# Patient Record
Sex: FEMALE | Race: WHITE | Employment: FULL TIME | URBAN - METROPOLITAN AREA
[De-identification: names, ages, dates, MRNs, and addresses within clinical notes are randomized per-mention and may not be internally consistent; named-entity substitution may affect disease eponyms.]

---

## 2019-07-20 ENCOUNTER — APPOINTMENT (OUTPATIENT)
Dept: GENERAL RADIOLOGY | Age: 39
End: 2019-07-20
Payer: MEDICAID

## 2019-07-20 ENCOUNTER — HOSPITAL ENCOUNTER (EMERGENCY)
Age: 39
Discharge: HOME | End: 2019-07-20
Attending: EMERGENCY MEDICINE
Payer: MEDICAID

## 2019-07-20 VITALS
TEMPERATURE: 98.4 F | WEIGHT: 153.22 LBS | SYSTOLIC BLOOD PRESSURE: 132 MMHG | BODY MASS INDEX: 28.93 KG/M2 | HEART RATE: 99 BPM | HEIGHT: 61 IN | OXYGEN SATURATION: 98 % | RESPIRATION RATE: 20 BRPM | DIASTOLIC BLOOD PRESSURE: 99 MMHG

## 2019-07-20 DIAGNOSIS — S90.32XA CONTUSION OF LEFT FOOT, INITIAL ENCOUNTER: Primary | ICD-10-CM

## 2019-07-20 DIAGNOSIS — S97.82XA CRUSHING INJURY OF LEFT FOOT, INITIAL ENCOUNTER: ICD-10-CM

## 2019-07-20 DIAGNOSIS — T14.8XXA CONTUSION OF BONE: ICD-10-CM

## 2019-07-20 PROCEDURE — 73630 X-RAY EXAM OF FOOT: CPT

## 2019-07-20 PROCEDURE — 99283 EMERGENCY DEPT VISIT LOW MDM: CPT

## 2019-07-20 PROCEDURE — 6370000000 HC RX 637 (ALT 250 FOR IP): Performed by: EMERGENCY MEDICINE

## 2019-07-20 RX ORDER — NAPROXEN 250 MG/1
500 TABLET ORAL ONCE
Status: COMPLETED | OUTPATIENT
Start: 2019-07-20 | End: 2019-07-20

## 2019-07-20 RX ORDER — ACETAMINOPHEN 500 MG
500 TABLET ORAL ONCE
Status: COMPLETED | OUTPATIENT
Start: 2019-07-20 | End: 2019-07-20

## 2019-07-20 RX ORDER — NAPROXEN 250 MG/1
500 TABLET ORAL 2 TIMES DAILY WITH MEALS
Qty: 28 TABLET | Refills: 0 | Status: SHIPPED | OUTPATIENT
Start: 2019-07-20 | End: 2019-07-27

## 2019-07-20 RX ADMIN — NAPROXEN 500 MG: 250 TABLET ORAL at 15:19

## 2019-07-20 RX ADMIN — ACETAMINOPHEN 500 MG: 500 TABLET ORAL at 15:19

## 2019-07-20 ASSESSMENT — PAIN DESCRIPTION - LOCATION
LOCATION: FOOT
LOCATION: FOOT

## 2019-07-20 ASSESSMENT — PAIN SCALES - GENERAL
PAINLEVEL_OUTOF10: 7
PAINLEVEL_OUTOF10: 7

## 2019-07-20 ASSESSMENT — PAIN DESCRIPTION - ORIENTATION
ORIENTATION: LEFT
ORIENTATION: LEFT

## 2019-07-20 ASSESSMENT — PAIN DESCRIPTION - FREQUENCY: FREQUENCY: INTERMITTENT

## 2019-07-20 ASSESSMENT — PAIN DESCRIPTION - DESCRIPTORS
DESCRIPTORS: THROBBING
DESCRIPTORS: THROBBING

## 2019-07-20 ASSESSMENT — PAIN DESCRIPTION - PAIN TYPE
TYPE: ACUTE PAIN
TYPE: ACUTE PAIN

## 2019-07-20 NOTE — ED PROVIDER NOTES
CHIEF COMPLAINT  Foot Injury (cooler landed on left foot. bruise from 2nd toe to little toe. Happened 1 hour prior to coming here. )      HISTORY OF PRESENT ILLNESS  Danielriyudith Garcia is a 35 y.o. female who presents to the ED complaining of severe pain in the distal portion of the left foot after crushing injury. She was carrying a cooler full of ice, water, and soda. She dropped a cooler when a hornet was flying by her head and it landed on her left foot. She had significant pain afterwards and has difficulty putting weight on the front of the foot. She did not fall or hit her head. She has no numbness in the foot. She has not taken anything for pain. She did apply ice after the injury. No other complaints, modifying factors or associated symptoms. Nursing notes reviewed. Past Medical History:   Diagnosis Date    Nicotine abuse      History reviewed. No pertinent surgical history. Family History   Problem Relation Age of Onset    Other Mother      Social History     Socioeconomic History    Marital status: Single     Spouse name: Not on file    Number of children: Not on file    Years of education: Not on file    Highest education level: Not on file   Occupational History    Not on file   Social Needs    Financial resource strain: Not on file    Food insecurity:     Worry: Not on file     Inability: Not on file    Transportation needs:     Medical: Not on file     Non-medical: Not on file   Tobacco Use    Smoking status: Current Every Day Smoker     Packs/day: 1.00     Years: 20.00     Pack years: 20.00     Types: Cigarettes    Smokeless tobacco: Never Used   Substance and Sexual Activity    Alcohol use:  Yes     Alcohol/week: 12.0 standard drinks     Types: 12 Cans of beer per week     Comment: socially    Drug use: No    Sexual activity: Not on file   Lifestyle    Physical activity:     Days per week: Not on file     Minutes per session: Not on file    Stress: Not on file

## 2019-07-20 NOTE — ED NOTES
Discharge instructions reviewed. Patient verbalized understanding. Prescription x1 given.        Conner Gutierres RN  07/20/19 9947

## 2019-07-20 NOTE — ED TRIAGE NOTES
Patient came to ER with left foot pain. Patient dropped cooler on foot when a bee landed on her while carrying the cooler with a friend.

## 2021-02-22 ENCOUNTER — HOSPITAL ENCOUNTER (OUTPATIENT)
Dept: ULTRASOUND IMAGING | Age: 41
Discharge: HOME | End: 2021-02-22
Payer: MEDICAID

## 2021-02-22 ENCOUNTER — HOSPITAL ENCOUNTER (OUTPATIENT)
Dept: WOMENS IMAGING | Age: 41
Discharge: HOME | End: 2021-02-22
Payer: MEDICAID

## 2021-02-22 DIAGNOSIS — Z12.31 VISIT FOR SCREENING MAMMOGRAM: ICD-10-CM

## 2021-02-22 DIAGNOSIS — N63.0 LUMP OR MASS IN BREAST: ICD-10-CM

## 2021-02-22 DIAGNOSIS — N64.52 NIPPLE DISCHARGE: ICD-10-CM

## 2021-02-22 PROCEDURE — G0279 TOMOSYNTHESIS, MAMMO: HCPCS

## 2021-02-22 PROCEDURE — 76642 ULTRASOUND BREAST LIMITED: CPT

## 2023-06-27 ENCOUNTER — OFFICE VISIT (OUTPATIENT)
Dept: GYNECOLOGY | Age: 43
End: 2023-06-27
Payer: MEDICAID

## 2023-06-27 VITALS — SYSTOLIC BLOOD PRESSURE: 110 MMHG | DIASTOLIC BLOOD PRESSURE: 70 MMHG | WEIGHT: 153.8 LBS | BODY MASS INDEX: 29.06 KG/M2

## 2023-06-27 DIAGNOSIS — R87.613 HGSIL (HIGH GRADE SQUAMOUS INTRAEPITHELIAL LESION) ON PAP SMEAR OF CERVIX: Primary | ICD-10-CM

## 2023-06-27 PROCEDURE — 99214 OFFICE O/P EST MOD 30 MIN: CPT | Performed by: OBSTETRICS & GYNECOLOGY

## 2023-06-27 PROCEDURE — G8427 DOCREV CUR MEDS BY ELIG CLIN: HCPCS | Performed by: OBSTETRICS & GYNECOLOGY

## 2023-06-27 PROCEDURE — 4004F PT TOBACCO SCREEN RCVD TLK: CPT | Performed by: OBSTETRICS & GYNECOLOGY

## 2023-06-27 PROCEDURE — G8419 CALC BMI OUT NRM PARAM NOF/U: HCPCS | Performed by: OBSTETRICS & GYNECOLOGY

## 2023-06-27 RX ORDER — SODIUM CHLORIDE 0.9 % (FLUSH) 0.9 %
5-40 SYRINGE (ML) INJECTION PRN
OUTPATIENT
Start: 2023-06-27

## 2023-06-27 RX ORDER — SODIUM CHLORIDE 9 MG/ML
INJECTION, SOLUTION INTRAVENOUS PRN
OUTPATIENT
Start: 2023-06-27

## 2023-06-27 RX ORDER — SODIUM CHLORIDE, SODIUM LACTATE, POTASSIUM CHLORIDE, CALCIUM CHLORIDE 600; 310; 30; 20 MG/100ML; MG/100ML; MG/100ML; MG/100ML
INJECTION, SOLUTION INTRAVENOUS CONTINUOUS
OUTPATIENT
Start: 2023-06-27

## 2023-06-27 RX ORDER — SODIUM CHLORIDE 0.9 % (FLUSH) 0.9 %
5-40 SYRINGE (ML) INJECTION EVERY 12 HOURS SCHEDULED
OUTPATIENT
Start: 2023-06-27

## 2023-07-12 ENCOUNTER — ANESTHESIA EVENT (OUTPATIENT)
Dept: OPERATING ROOM | Age: 43
End: 2023-07-12
Payer: MEDICAID

## 2023-07-13 ENCOUNTER — ANESTHESIA (OUTPATIENT)
Dept: OPERATING ROOM | Age: 43
End: 2023-07-13
Payer: MEDICAID

## 2023-07-13 ENCOUNTER — HOSPITAL ENCOUNTER (OUTPATIENT)
Age: 43
Setting detail: OUTPATIENT SURGERY
Discharge: HOME | End: 2023-07-13
Attending: OBSTETRICS & GYNECOLOGY | Admitting: OBSTETRICS & GYNECOLOGY
Payer: MEDICAID

## 2023-07-13 VITALS
DIASTOLIC BLOOD PRESSURE: 83 MMHG | HEIGHT: 61 IN | HEART RATE: 71 BPM | OXYGEN SATURATION: 97 % | SYSTOLIC BLOOD PRESSURE: 129 MMHG | RESPIRATION RATE: 15 BRPM | WEIGHT: 149.91 LBS | TEMPERATURE: 97.5 F | BODY MASS INDEX: 28.3 KG/M2

## 2023-07-13 DIAGNOSIS — R87.613 HIGH GRADE SQUAMOUS INTRAEPITHELIAL LESION OF CERVIX: ICD-10-CM

## 2023-07-13 LAB — HCG UR QL: NEGATIVE

## 2023-07-13 PROCEDURE — 2709999900 HC NON-CHARGEABLE SUPPLY: Performed by: OBSTETRICS & GYNECOLOGY

## 2023-07-13 PROCEDURE — 6370000000 HC RX 637 (ALT 250 FOR IP): Performed by: OBSTETRICS & GYNECOLOGY

## 2023-07-13 PROCEDURE — 3700000001 HC ADD 15 MINUTES (ANESTHESIA): Performed by: OBSTETRICS & GYNECOLOGY

## 2023-07-13 PROCEDURE — 88305 TISSUE EXAM BY PATHOLOGIST: CPT

## 2023-07-13 PROCEDURE — 3600000014 HC SURGERY LEVEL 4 ADDTL 15MIN: Performed by: OBSTETRICS & GYNECOLOGY

## 2023-07-13 PROCEDURE — 6360000002 HC RX W HCPCS

## 2023-07-13 PROCEDURE — 84703 CHORIONIC GONADOTROPIN ASSAY: CPT

## 2023-07-13 PROCEDURE — 7100000000 HC PACU RECOVERY - FIRST 15 MIN: Performed by: OBSTETRICS & GYNECOLOGY

## 2023-07-13 PROCEDURE — 3700000000 HC ANESTHESIA ATTENDED CARE: Performed by: OBSTETRICS & GYNECOLOGY

## 2023-07-13 PROCEDURE — 3600000004 HC SURGERY LEVEL 4 BASE: Performed by: OBSTETRICS & GYNECOLOGY

## 2023-07-13 PROCEDURE — A4217 STERILE WATER/SALINE, 500 ML: HCPCS | Performed by: OBSTETRICS & GYNECOLOGY

## 2023-07-13 PROCEDURE — 7100000001 HC PACU RECOVERY - ADDTL 15 MIN: Performed by: OBSTETRICS & GYNECOLOGY

## 2023-07-13 PROCEDURE — 2580000003 HC RX 258: Performed by: OBSTETRICS & GYNECOLOGY

## 2023-07-13 PROCEDURE — 7100000010 HC PHASE II RECOVERY - FIRST 15 MIN: Performed by: OBSTETRICS & GYNECOLOGY

## 2023-07-13 PROCEDURE — 7100000011 HC PHASE II RECOVERY - ADDTL 15 MIN: Performed by: OBSTETRICS & GYNECOLOGY

## 2023-07-13 PROCEDURE — 2500000003 HC RX 250 WO HCPCS: Performed by: OBSTETRICS & GYNECOLOGY

## 2023-07-13 PROCEDURE — 57522 CONIZATION OF CERVIX: CPT | Performed by: OBSTETRICS & GYNECOLOGY

## 2023-07-13 PROCEDURE — 2580000003 HC RX 258: Performed by: ANESTHESIOLOGY

## 2023-07-13 PROCEDURE — 2500000003 HC RX 250 WO HCPCS

## 2023-07-13 RX ORDER — SODIUM CHLORIDE 0.9 % (FLUSH) 0.9 %
5-40 SYRINGE (ML) INJECTION PRN
Status: DISCONTINUED | OUTPATIENT
Start: 2023-07-13 | End: 2023-07-13 | Stop reason: HOSPADM

## 2023-07-13 RX ORDER — ONDANSETRON 2 MG/ML
INJECTION INTRAMUSCULAR; INTRAVENOUS PRN
Status: DISCONTINUED | OUTPATIENT
Start: 2023-07-13 | End: 2023-07-13 | Stop reason: SDUPTHER

## 2023-07-13 RX ORDER — SODIUM CHLORIDE 0.9 % (FLUSH) 0.9 %
5-40 SYRINGE (ML) INJECTION EVERY 12 HOURS SCHEDULED
Status: DISCONTINUED | OUTPATIENT
Start: 2023-07-13 | End: 2023-07-13 | Stop reason: SDUPTHER

## 2023-07-13 RX ORDER — MEPERIDINE HYDROCHLORIDE 25 MG/ML
12.5 INJECTION INTRAMUSCULAR; INTRAVENOUS; SUBCUTANEOUS
Status: DISCONTINUED | OUTPATIENT
Start: 2023-07-13 | End: 2023-07-13 | Stop reason: HOSPADM

## 2023-07-13 RX ORDER — LORAZEPAM 2 MG/ML
0.5 INJECTION INTRAMUSCULAR
Status: DISCONTINUED | OUTPATIENT
Start: 2023-07-13 | End: 2023-07-13 | Stop reason: HOSPADM

## 2023-07-13 RX ORDER — SODIUM CHLORIDE 0.9 % (FLUSH) 0.9 %
5-40 SYRINGE (ML) INJECTION EVERY 12 HOURS SCHEDULED
Status: DISCONTINUED | OUTPATIENT
Start: 2023-07-13 | End: 2023-07-13 | Stop reason: HOSPADM

## 2023-07-13 RX ORDER — DEXAMETHASONE SODIUM PHOSPHATE 4 MG/ML
INJECTION, SOLUTION INTRA-ARTICULAR; INTRALESIONAL; INTRAMUSCULAR; INTRAVENOUS; SOFT TISSUE PRN
Status: DISCONTINUED | OUTPATIENT
Start: 2023-07-13 | End: 2023-07-13 | Stop reason: SDUPTHER

## 2023-07-13 RX ORDER — FERRIC SUBSULFATE 0.21 G/G
LIQUID TOPICAL
Status: COMPLETED | OUTPATIENT
Start: 2023-07-13 | End: 2023-07-13

## 2023-07-13 RX ORDER — DIPHENHYDRAMINE HYDROCHLORIDE 50 MG/ML
12.5 INJECTION INTRAMUSCULAR; INTRAVENOUS
Status: DISCONTINUED | OUTPATIENT
Start: 2023-07-13 | End: 2023-07-13 | Stop reason: HOSPADM

## 2023-07-13 RX ORDER — FENTANYL CITRATE 50 UG/ML
INJECTION, SOLUTION INTRAMUSCULAR; INTRAVENOUS PRN
Status: DISCONTINUED | OUTPATIENT
Start: 2023-07-13 | End: 2023-07-13 | Stop reason: SDUPTHER

## 2023-07-13 RX ORDER — SODIUM CHLORIDE, SODIUM LACTATE, POTASSIUM CHLORIDE, CALCIUM CHLORIDE 600; 310; 30; 20 MG/100ML; MG/100ML; MG/100ML; MG/100ML
INJECTION, SOLUTION INTRAVENOUS CONTINUOUS
Status: DISCONTINUED | OUTPATIENT
Start: 2023-07-13 | End: 2023-07-13 | Stop reason: HOSPADM

## 2023-07-13 RX ORDER — SODIUM CHLORIDE 9 MG/ML
INJECTION, SOLUTION INTRAVENOUS PRN
Status: DISCONTINUED | OUTPATIENT
Start: 2023-07-13 | End: 2023-07-13 | Stop reason: SDUPTHER

## 2023-07-13 RX ORDER — OXYCODONE HYDROCHLORIDE AND ACETAMINOPHEN 5; 325 MG/1; MG/1
1 TABLET ORAL EVERY 6 HOURS PRN
Qty: 28 TABLET | Refills: 0 | Status: SHIPPED | OUTPATIENT
Start: 2023-07-13 | End: 2023-07-20

## 2023-07-13 RX ORDER — ONDANSETRON 2 MG/ML
4 INJECTION INTRAMUSCULAR; INTRAVENOUS
Status: DISCONTINUED | OUTPATIENT
Start: 2023-07-13 | End: 2023-07-13 | Stop reason: HOSPADM

## 2023-07-13 RX ORDER — MAGNESIUM HYDROXIDE 1200 MG/15ML
LIQUID ORAL CONTINUOUS PRN
Status: DISCONTINUED | OUTPATIENT
Start: 2023-07-13 | End: 2023-07-13 | Stop reason: HOSPADM

## 2023-07-13 RX ORDER — MIDAZOLAM HYDROCHLORIDE 1 MG/ML
INJECTION INTRAMUSCULAR; INTRAVENOUS PRN
Status: DISCONTINUED | OUTPATIENT
Start: 2023-07-13 | End: 2023-07-13 | Stop reason: SDUPTHER

## 2023-07-13 RX ORDER — PHENYLEPHRINE HCL IN 0.9% NACL 1 MG/10 ML
SYRINGE (ML) INTRAVENOUS PRN
Status: DISCONTINUED | OUTPATIENT
Start: 2023-07-13 | End: 2023-07-13 | Stop reason: SDUPTHER

## 2023-07-13 RX ORDER — SODIUM CHLORIDE 9 MG/ML
INJECTION, SOLUTION INTRAVENOUS PRN
Status: DISCONTINUED | OUTPATIENT
Start: 2023-07-13 | End: 2023-07-13 | Stop reason: HOSPADM

## 2023-07-13 RX ORDER — LIDOCAINE HYDROCHLORIDE 20 MG/ML
INJECTION, SOLUTION EPIDURAL; INFILTRATION; INTRACAUDAL; PERINEURAL PRN
Status: DISCONTINUED | OUTPATIENT
Start: 2023-07-13 | End: 2023-07-13 | Stop reason: SDUPTHER

## 2023-07-13 RX ORDER — FENTANYL CITRATE 50 UG/ML
50 INJECTION, SOLUTION INTRAMUSCULAR; INTRAVENOUS EVERY 5 MIN PRN
Status: DISCONTINUED | OUTPATIENT
Start: 2023-07-13 | End: 2023-07-13 | Stop reason: HOSPADM

## 2023-07-13 RX ORDER — SODIUM CHLORIDE 0.9 % (FLUSH) 0.9 %
5-40 SYRINGE (ML) INJECTION PRN
Status: DISCONTINUED | OUTPATIENT
Start: 2023-07-13 | End: 2023-07-13 | Stop reason: SDUPTHER

## 2023-07-13 RX ORDER — OXYCODONE HYDROCHLORIDE AND ACETAMINOPHEN 5; 325 MG/1; MG/1
1 TABLET ORAL
Status: DISCONTINUED | OUTPATIENT
Start: 2023-07-13 | End: 2023-07-13 | Stop reason: HOSPADM

## 2023-07-13 RX ORDER — IODINE SOLUTION STRONG 5% (LUGOL'S) 5 %
SOLUTION ORAL
Status: COMPLETED | OUTPATIENT
Start: 2023-07-13 | End: 2023-07-13

## 2023-07-13 RX ORDER — KETOROLAC TROMETHAMINE 30 MG/ML
INJECTION, SOLUTION INTRAMUSCULAR; INTRAVENOUS PRN
Status: DISCONTINUED | OUTPATIENT
Start: 2023-07-13 | End: 2023-07-13 | Stop reason: SDUPTHER

## 2023-07-13 RX ORDER — PROPOFOL 10 MG/ML
INJECTION, EMULSION INTRAVENOUS PRN
Status: DISCONTINUED | OUTPATIENT
Start: 2023-07-13 | End: 2023-07-13 | Stop reason: SDUPTHER

## 2023-07-13 RX ORDER — HALOPERIDOL 5 MG/ML
1 INJECTION INTRAMUSCULAR
Status: DISCONTINUED | OUTPATIENT
Start: 2023-07-13 | End: 2023-07-13 | Stop reason: HOSPADM

## 2023-07-13 RX ADMIN — LIDOCAINE HYDROCHLORIDE 80 MG: 20 INJECTION, SOLUTION EPIDURAL; INFILTRATION; INTRACAUDAL; PERINEURAL at 08:39

## 2023-07-13 RX ADMIN — DEXAMETHASONE SODIUM PHOSPHATE 4 MG: 4 INJECTION, SOLUTION INTRAMUSCULAR; INTRAVENOUS at 08:42

## 2023-07-13 RX ADMIN — FENTANYL CITRATE 25 MCG: 50 INJECTION INTRAMUSCULAR; INTRAVENOUS at 08:35

## 2023-07-13 RX ADMIN — SODIUM CHLORIDE: 9 INJECTION, SOLUTION INTRAVENOUS at 08:04

## 2023-07-13 RX ADMIN — FENTANYL CITRATE 25 MCG: 50 INJECTION INTRAMUSCULAR; INTRAVENOUS at 09:15

## 2023-07-13 RX ADMIN — KETOROLAC TROMETHAMINE 30 MG: 30 INJECTION, SOLUTION INTRAMUSCULAR; INTRAVENOUS at 09:10

## 2023-07-13 RX ADMIN — Medication 50 MCG: at 09:13

## 2023-07-13 RX ADMIN — Medication 50 MCG: at 09:00

## 2023-07-13 RX ADMIN — MIDAZOLAM 2 MG: 1 INJECTION INTRAMUSCULAR; INTRAVENOUS at 08:35

## 2023-07-13 RX ADMIN — FENTANYL CITRATE 50 MCG: 50 INJECTION INTRAMUSCULAR; INTRAVENOUS at 08:56

## 2023-07-13 RX ADMIN — ONDANSETRON 4 MG: 2 INJECTION INTRAMUSCULAR; INTRAVENOUS at 08:42

## 2023-07-13 RX ADMIN — PROPOFOL 200 MG: 10 INJECTION, EMULSION INTRAVENOUS at 08:39

## 2023-07-13 ASSESSMENT — PAIN DESCRIPTION - DESCRIPTORS
DESCRIPTORS: CRAMPING;OTHER (COMMENT)
DESCRIPTORS: OTHER (COMMENT)

## 2023-07-13 ASSESSMENT — PAIN DESCRIPTION - PAIN TYPE
TYPE: SURGICAL PAIN
TYPE: SURGICAL PAIN

## 2023-07-13 ASSESSMENT — PAIN DESCRIPTION - ONSET
ONSET: ON-GOING
ONSET: ON-GOING

## 2023-07-13 ASSESSMENT — PAIN DESCRIPTION - LOCATION
LOCATION: ABDOMEN
LOCATION: VAGINA

## 2023-07-13 ASSESSMENT — PAIN DESCRIPTION - FREQUENCY
FREQUENCY: INTERMITTENT
FREQUENCY: CONTINUOUS

## 2023-07-13 ASSESSMENT — PAIN SCALES - GENERAL
PAINLEVEL_OUTOF10: 2
PAINLEVEL_OUTOF10: 3
PAINLEVEL_OUTOF10: 3
PAINLEVEL_OUTOF10: 0

## 2023-07-13 ASSESSMENT — PAIN - FUNCTIONAL ASSESSMENT
PAIN_FUNCTIONAL_ASSESSMENT: ACTIVITIES ARE NOT PREVENTED
PAIN_FUNCTIONAL_ASSESSMENT: ACTIVITIES ARE NOT PREVENTED
PAIN_FUNCTIONAL_ASSESSMENT: 0-10

## 2023-07-13 ASSESSMENT — ENCOUNTER SYMPTOMS: SHORTNESS OF BREATH: 0

## 2023-07-13 ASSESSMENT — PAIN DESCRIPTION - ORIENTATION
ORIENTATION: LOWER
ORIENTATION: INNER

## 2023-07-13 ASSESSMENT — LIFESTYLE VARIABLES: SMOKING_STATUS: 1

## 2023-07-13 NOTE — DISCHARGE INSTRUCTIONS
1 No tampons, douching, or intercourse for 4 weeks. 2 You may go up and down steps. 3 You may take a shower or take a bath. 4 Limit heavy lifting, nothing heavier than the equivalent of a gallon of milk. 5 No driving while taking pain medicine. 6 Call the office for a follow up appointment.   7 You will need to be seen in the office within 3-4 weeks

## 2023-07-13 NOTE — PROGRESS NOTES
Pt states pain remains tolerable, does not need pain pill at this time. Pt has percocet from retail pharmacy, going home with. Discharge instructions given with son at bedside. Pt declines help getting dressed, no issues. To car via wheelchair.

## 2023-07-13 NOTE — PROGRESS NOTES
Vaginal Sweep Documentation     Vaginal prep sponge count performed by Willie Carson RN and Rosemary Pal, RN. Count correct. Vaginal sweep performed by DR CORBETT  at [facility]. No foreign objects or vaginal tears noted.

## 2023-07-13 NOTE — ANESTHESIA POSTPROCEDURE EVALUATION
Department of Anesthesiology  Postprocedure Note    Patient: Caron Mendez  MRN: [id number]  [address] [facility] 1980  Date of evaluation: 7/13/2023      Procedure Summary     Date: 07/13/23 Room / Location: [address]    Anesthesia Start: 1109 Anesthesia Stop: 3906    Procedure: LOOP ELECTROSURGICAL EXCISION PROCEDURE (Vagina ) Diagnosis:       High grade squamous intraepithelial lesion of cervix      (High grade squamous intraepithelial lesion of cervix [R87.613])    Surgeons: Elke Taveras MD Responsible Provider: Mary Rivera MD    Anesthesia Type: General ASA Status: 2          Anesthesia Type: General    Karie Phase I: Karie Score: 10    Karie Phase II: Karie Score: 10      Anesthesia Post Evaluation    Patient location during evaluation: PACU  Patient participation: complete - patient participated  Level of consciousness: awake and alert  Pain score: 2  Airway patency: patent  Nausea & Vomiting: no nausea and no vomiting  Complications: no  Cardiovascular status: blood pressure returned to baseline  Respiratory status: acceptable  Hydration status: euvolemic

## 2023-07-13 NOTE — PROCEDURES
[address], [facility] [address]                                 PROCEDURE NOTE    PATIENT NAME: Zak Pruitt                   :        1980  MED REC NO:   [id number]                          ROOM:  ACCOUNT NO:   [de-identified]                           ADMIT DATE: 2023  PROVIDER:     Heidi Babin. MD Zeynep    DATE OF PROCEDURE:  2023    PREOPERATIVE DIAGNOSIS:  High-grade squamous intraepithelial lesion. POSTOPERATIVE DIAGNOSIS:  High-grade squamous intraepithelial lesion. OPERATION PERFORMED:  LEEP. SURGEON:  Christ Leach MD    ANESTHETIC:  General endotracheal anesthetic and local anesthetic. ESTIMATED BLOOD LOSS:  Less than 50 mL. PATHOLOGY:  LEEP specimen, endocervical curetting. DRAINS:  None. COMPLICATIONS:  None. DISPOSITION:  Stable to recovery room. INDICATIONS:  The patient is a 41-year-old female. She is  5,  para 3-0-2-3, presenting with low grade squamous lesion on Pap smear and  high-grade squamous intraepithelial lesion on colposcopy. I recommended  a LEEP procedure. I discussed the technique, the recovery, and the  risks with the patient. They include but are not limited to bleeding,  infection, damage to her internal organs such as bladder and bowel and  ureters with need for subsequent reparative surgery if damage occurred. The patient voiced understanding and agreed to proceed with the surgery. PROCEDURE:  The patient was taken to the operating room, was prepped and  draped in normal sterile fashion in the dorsal lithotomy position. A  coated speculum was placed in the patient's vagina. Cervix was  identified. Cervix was injected with 1% lidocaine with epinephrine  solution. Cervix was painted with Lugol's solution, transformation zone  was identified.   LEEP was performed across the transformation zone using  two sections with the LEEP device

## 2023-07-13 NOTE — PROGRESS NOTES
Pt arrived from PACU alert and oriented, vitals stable on room air. Reports pain as cramping/pinching- 3/10. PO snacks given, son at bedside.

## 2023-07-13 NOTE — BRIEF OP NOTE
Brief Postoperative Note      Patient: Samy Diana  YOB: 1980  MRN: [id number]    Date of Procedure: 7/13/2023    Pre-Op Diagnosis Codes:     * High grade squamous intraepithelial lesion of cervix [R87.613]    Post-Op Diagnosis: Same       Procedure(s):  LOOP ELECTROSURGICAL EXCISION PROCEDURE    Surgeon(s):  Maryam Palma MD    Assistant:  Surgical Assistant: Pierce Whitaker    Anesthesia: General    Estimated Blood Loss (mL): less than 50     Complications: None    Specimens:   ID Type Source Tests Collected by Time Destination   A : A) ENDOCERVICAL CURRETTINGS Tissue Tissue SURGICAL PATHOLOGY Maryam Palma MD 5/52/7005 4650    B : B) LEEP SPECIMEN Tissue Tissue SURGICAL PATHOLOGY Maryam Palma MD 7/62/6804 6318        Implants:  * No implants in log *      Drains: * No LDAs found *    Findings: NORMAL ANATOMY      Electronically signed by Maryam Palma MD on 5/89/2403 at 9:17 AM

## 2023-07-13 NOTE — H&P
Date of Surgery Update:  Edward Kee was seen, history and physical examination reviewed, and patient examined by me today. There have been no significant clinical changes since the completion of the previous history and physical.    The risk, benefits, and alternatives of the proposed procedure have been explained to the patient (or appropriate guardian) and understanding verbalized. All questions answered. Patient wishes to proceed.     Electronically signed by: ANTHONY Franklin,9/64/7876,8:97 AM

## (undated) DEVICE — NEPTUNE E-SEP SMOKE EVACUATION PENCIL, COATED, 70MM BLADE, PUSH BUTTON SWITCH: Brand: NEPTUNE E-SEP

## (undated) DEVICE — MINOR LITHOTOMY: Brand: MEDLINE INDUSTRIES, INC.

## (undated) DEVICE — CYSTO/BLADDER IRRIGATION SET, REGULATING CLAMP

## (undated) DEVICE — ELECTRODE BALL DIA5MM TUNGSTEN LLETZ DURABLE RESIST

## (undated) DEVICE — SOLUTION IV 1000ML 0.9% SOD CHL PH 5 INJ USP VIAFLX PLAS

## (undated) DEVICE — PAD SANITARY MTRN TAB BELT WRP NS 11IN

## (undated) DEVICE — DRAPE LAP 104X35X124IN BLU CTRL + FAB REINF ABD FEN

## (undated) DEVICE — ELECTRODE LOOP 12X20MM SHFT L5IN DIA3/32IN LEEP LLETZ

## (undated) DEVICE — GLOVE SURG SZ 8 CRM LTX FREE POLYISOPRENE POLYMER BEAD ANTI